# Patient Record
(demographics unavailable — no encounter records)

---

## 2025-07-28 NOTE — REVIEW OF SYSTEMS
[Appropriate Age Development] : development appropriate for age [Change in Activity] : no change in activity [Fever Above 102] : no fever [Rash] : no rash [Itching] : no itching [Eye Pain] : no eye pain [Redness] : no redness [Tachypnea] : no tachypnea [Cough] : no cough [Limping] : no limping [Joint Pains] : no arthralgias [Joint Swelling] : no joint swelling [Back Pain] : ~T no back pain [Muscle Aches] : no muscle aches

## 2025-07-28 NOTE — END OF VISIT
[FreeTextEntry3] : I, Chao Rodriguez MD, I personally performed the services described in the documentation, reviewed the documentation recorded by the scribe in my presence and it accurately and completely records my words and actions

## 2025-07-28 NOTE — HISTORY OF PRESENT ILLNESS
[FreeTextEntry1] : LORAINE is a 15-year-old M who presents for evaluation of a left hamstring strain  The patient first sustained his injury around the week of 07/04/2025 while running. He immediately experienced pain with any attempt of touching or moving his left hamstring. The patient then went to the PCP for a clinical reevaluation; he was doing much better but still reported some slight pain around his left hamstring during a routine PE. The patient was recommended to see a peds ortho office for further evaluation and for physical activity clearance.   The patient, in this office, is doing much better. He reports no discomfort around his left hamstring today in the office.

## 2025-07-28 NOTE — ASSESSMENT
[FreeTextEntry1] : LORAINE is a 15-year-old M with left resolved hamstring strain  Today's visit included obtaining history from the child parent due to the child's age, the child could not be considered a reliable historian, requiring parent to act as independent historian. Long discussion was done with family regarding diagnosis, treatment options and prognosis.  Overall, LORAINE is doing much better today in the office. On his PE, the patient is able to perform extension and flexion exercises around his left knee without complaining of any discomfort involving his left hamstring.   Recommendations: The patient will be cleared for all activities. A note was provided today in the office,  RTO as needed.   This plan was discussed with family. Family verbalizes understanding and agreement of plan. All questions and concerns were addressed today.  I, Miguel Angel Johnson, have acted as a scribe and documented the above for Dr. Rodriguez on 07/28/2025.

## 2025-07-28 NOTE — REASON FOR VISIT
[Initial Evaluation] : an initial evaluation [Patient] : patient [Mother] : mother [FreeTextEntry1] : left hamstring strain

## 2025-07-28 NOTE — PHYSICAL EXAM
[FreeTextEntry1] : General: Patient is awake and alert and in no acute distress. oriented to person, place. well developed, well nourished, cooperative.   Skin: The skin is intact, warm, pink, and dry over the area examined.   Eyes: normal conjunctiva, normal eyelids and pupils were equal and round.   ENT: normal ears, normal nose and normal lips.   Cardiovascular: There is brisk capillary refill in the digits of the affected extremity. They are symmetric pulses in the bilateral upper and lower extremities, positive peripheral pulses, brisk capillary refill, but no peripheral edema.   Respiratory: The patient is in no apparent respiratory distress. They're taking full deep breaths without use of accessory muscles or evidence of audible wheezes or stridor without the use of a stethoscope, normal respiratory effort.   Neurological: 5/5 motor strength in the main muscle groups of bilateral lower extremities, sensory intact in bilateral lower extremities.   Musculoskeletal: Normal gait for age. good posture. normal clinical alignment in upper and lower extremities. full range of motion in bilateral upper and lower extremities. normal clinical alignment of the spine. both knee with no swelling, normal alignment. full ROM. STABLE With negative Lachman. no meniscal sign. no bony tenderness.  NV intact